# Patient Record
Sex: MALE | Race: WHITE
[De-identification: names, ages, dates, MRNs, and addresses within clinical notes are randomized per-mention and may not be internally consistent; named-entity substitution may affect disease eponyms.]

---

## 2018-01-16 ENCOUNTER — HOSPITAL ENCOUNTER (INPATIENT)
Dept: HOSPITAL 68 - ERH | Age: 58
LOS: 5 days | Discharge: HOME HEALTH | DRG: 392 | End: 2018-01-21
Attending: SURGERY | Admitting: SURGERY
Payer: COMMERCIAL

## 2018-01-16 VITALS — WEIGHT: 180 LBS | HEIGHT: 71 IN | BODY MASS INDEX: 25.2 KG/M2

## 2018-01-16 VITALS — DIASTOLIC BLOOD PRESSURE: 86 MMHG | SYSTOLIC BLOOD PRESSURE: 130 MMHG

## 2018-01-16 VITALS — DIASTOLIC BLOOD PRESSURE: 80 MMHG | SYSTOLIC BLOOD PRESSURE: 120 MMHG

## 2018-01-16 DIAGNOSIS — B96.20: ICD-10-CM

## 2018-01-16 DIAGNOSIS — K57.20: Primary | ICD-10-CM

## 2018-01-16 DIAGNOSIS — B95.4: ICD-10-CM

## 2018-01-16 DIAGNOSIS — F17.210: ICD-10-CM

## 2018-01-16 LAB
ABSOLUTE GRANULOCYTE CT: 17 /CUMM (ref 1.4–6.5)
BASOPHILS # BLD: 0.1 /CUMM (ref 0–0.2)
BASOPHILS NFR BLD: 0.3 % (ref 0–2)
EOSINOPHIL # BLD: 0.1 /CUMM (ref 0–0.7)
EOSINOPHIL NFR BLD: 0.5 % (ref 0–5)
ERYTHROCYTE [DISTWIDTH] IN BLOOD BY AUTOMATED COUNT: 13.6 % (ref 11.5–14.5)
GRANULOCYTES NFR BLD: 86.2 % (ref 42.2–75.2)
HCT VFR BLD CALC: 46.1 % (ref 42–52)
LYMPHOCYTES # BLD: 1.1 /CUMM (ref 1.2–3.4)
MCH RBC QN AUTO: 32.5 PG (ref 27–31)
MCHC RBC AUTO-ENTMCNC: 33.5 G/DL (ref 33–37)
MCV RBC AUTO: 97 FL (ref 80–94)
MONOCYTES # BLD: 1.5 /CUMM (ref 0.1–0.6)
PLATELET # BLD: 571 /CUMM (ref 130–400)
PMV BLD AUTO: 7.1 FL (ref 7.4–10.4)
PROTHROMBIN TIME: 13.2 SEC (ref 9.4–12.5)
RED BLOOD CELL CT: 4.75 /CUMM (ref 4.7–6.1)
WBC # BLD AUTO: 19.7 /CUMM (ref 4.8–10.8)

## 2018-01-16 PROCEDURE — 2NBSP: CPT

## 2018-01-16 PROCEDURE — 0J9830Z DRAINAGE OF ABDOMEN SUBCUTANEOUS TISSUE AND FASCIA WITH DRAINAGE DEVICE, PERCUTANEOUS APPROACH: ICD-10-PCS

## 2018-01-16 PROCEDURE — 87075 CULTR BACTERIA EXCEPT BLOOD: CPT

## 2018-01-16 PROCEDURE — G0480 DRUG TEST DEF 1-7 CLASSES: HCPCS

## 2018-01-16 PROCEDURE — 2NSBP: CPT

## 2018-01-16 PROCEDURE — S5012 5% DEXTROSE WITH POTASSIUM: HCPCS

## 2018-01-16 NOTE — CT SCAN REPORT
EXAMINATION:
CT ABDOMEN AND PELVIS WITH CONTRAST
 
CLINICAL INFORMATION:
Generalized abdominal pain and leukocytosis.
 
COMPARISON:
None
 
TECHNIQUE:
Multidetector volumetric imaging was performed of the abdomen and pelvis
following IV administration of 95 mL of Optiray 320 intravenous contrast.
Sagittal and coronal reformatted images were obtained on the technologist's
workstation.
 
DLP:
412 mGy-cm
 
FINDINGS:
 
LUNG BASES: Trace right pleural effusion and mild right basilar atelectasis.
 
LIVER, GALLBLADDER, AND BILIARY TREE: Liver has normal size, contour and
attenuation. No evidence of hepatic mass. Gallbladder is moderately distended
and without radiopaque calculi or wall edema. No intrahepatic or extrahepatic
bile duct dilatation.
 
PANCREAS: Unremarkable.
 
SPLEEN: Unremarkable.
 
ADRENAL GLANDS: Unremarkable.
 
KIDNEYS AND URETERS: Kidneys have normal size, cortical thickness and
attenuation. No nephrolithiasis or hydronephrosis. The two subcentimeter
sized foci of hypoattenuation left kidney are likely cysts but are too small
to definitively characterize. The ureters are unremarkable.
 
BLADDER: Unremarkable.
 
GASTROINTESTINAL TRACT: Stomach is normal. The terminal ileum is normal. The
appendix is normal. There are multiple diverticula of the descending and
sigmoid colon, and there is fat stranding around the inflamed sigmoid colon.
A 6.5 x 7.5 x 7 cm collection within the sigmoid mesentery containing gas and
fluid is compatible with a perisigmoid abscess, and this exerts mass effect
upon adjacent small bowel which has an edematous wall. Proximal to this
level, small bowel is dilated up to 3.6 cm AP diameter. A small amount of
rim-enhancing fluid extends posteriorly and distally into the pelvic
cul-de-sac.  No pneumoperitoneum.
 
ABDOMINAL WALL: Unremarkable.
 
LYMPH NODES: Mild lymphadenopathy within the mesentery; largest mesenteric
lymph nodes measure up to 0.9 cm short axis dimension. No pathologic sized
retroperitoneal, iliac or inguinal lymph nodes.
 
VASCULAR: Mild atherosclerosis of the abdominal aorta without aneurysm.
 
PELVIC VISCERA: Prostate gland measures approximately 4.8 x 3.1 x 4.1 cm.
 
OSSEOUS STRUCTURES: No suspicious bone lesions. Mild degenerative disc space
narrowing and traction osteophyte formation of the visualized lower thoracic
spine. There are Schmorl's nodes at the L2 and L3 superior endplates.
 
IMPRESSION:
Sigmoid colon diverticulitis with 6.5 x 7.5 x 7 cm perisigmoid abscess. The
abscess exerts mass effect upon the adjacent small bowel, and there is
reactive inflammatory wall thickening of the small bowel. Small bowel is
dilated up to 3.6 cm AP diameter -- either from obstruction caused by the
mass effect from the abscess or from an ileus. No pneumoperitoneum. There is
mild lymphadenopathy within the mesentery.

## 2018-01-16 NOTE — ADMISSION CORE MEASURES
Acute Coronary Syndrome (CM)
 
ACS Core Measures
Acute Coronary Syndrome Diagnosis No
 
Congestive Heart Failure (NEW)
 
CHF Core Measures
Congestive Heart Failure Diagnosis No
 
Cerebrovascular Accident (NEW)
 
CVA Core Measures
CVA/TIA Diagnosis No
 
Venous Thromboembolism AUG17
 
VTE Core Anju (View Protocol)
VTE Risk Factors Age>40
No Mechanical VTE Prophylaxis d/t N/A MechProphylax Ordered
No VTE Pharm Prophylaxis d/t NA PharmProphylax ordered
 
Problem List
 
As ranked by this Provider
includes Assessment & Plan
 1. Abscess of sigmoid colon due to diverticulitis
 
 
HOME MEDS
Home Med List
No Known Home Medications

## 2018-01-16 NOTE — CT SCAN REPORT
PROCEDURE:
CAT scan guided abscess drainage
 
INDICATION:
Perisigmoid abscess
 
ACCESS:
12 Bulgarian locking pigtail catheter
 
SPECIMEN:
Culture Specimen were appropriately labeled and sent to the laboratory for
evaluation with request to inform  the referring physician of results.
 
MEDICATIONS/ SEDATION:
-Moderate sedation was provided under the direct supervision of a
interventional radiology nurse. A total dosage of 2 mg Versed and 100 mcg
Fentanyl was administered in divided doses. Total sedation time of 20
minutes.
-10ml 1% lidocaine for local anesthesia
 
COMPARISON:
Same day CT abdomen pelvis
 
DLP:
363 mGy-cm
 
CONSENT:
Informed consent was obtained from the patient prior to the procedure. During
this process, the procedure and potential alternatives was explained along
with the intended outcome and benefits. The risks of the procedure, including
the possibility of an unsuccessful procedure, as well as the risk of not
doing the procedure were discussed. The patient was given the opportunity to
ask any questions regarding the procedure and appeared competent to make
medical decisions. A signed consent form which documents this discussion was
placed in the medical record.
 
TECHNIQUE:
The patient was brought to the CAT scan room and placed in the supine
position. A final timeout was completed. CAT scan images of abdomen/pelvis
were obtained to localize the perisigmoid abscess.
 
An area of the patient's lower abdomen/upper pelvis was prepped and draped in
standard sterile fashion. 10ml of 1% lidocaine was used to obtain local
anesthesia of the skin and deeper tissues. An 5 Bulgarian Yueh needle was passed
through the skin and placed into the abscess using CT fluoroscopic guidance.
A guidewire was passed through the  needle and coiled within the abscess.
This was confirmed with CT fluoroscopic imaging. The access needle was
removed.
 
A series of tissue dilators numbering 8, 10 and 12 were used to dilate the
tract. A 12 Bulgarian locking pigtail catheter was passed over the wire and
placed within the abscess. One suture was used to secure the catheter to the
skin. A StatLock device was also utilized. A sterile dressing was applied.
The catheter was attached to a gravity collection bag.
 
SAMPLE:
Approximately 30 mL of bloody, nonclotting pelvis was aspirated in the room.
A sample was sent for culture.
 
COMPLICATIONS:
The patient tolerated the procedure well without complications. He was
transferred back to the emergency room in good condition.
 
CONCLUSION:
CAT scan guided abscess drainage as described.
 
PLAN:
1. Continued drainage of abscess into gravity collection bag.
2. Flushing of drainage catheter with 10 mL sterile saline twice daily.
3. Drain will be managed by the surgical team.

## 2018-01-16 NOTE — HISTORY & PHYSICAL
**See Addendum**
Jerel Blood 18 1529:
General Information and HPI
History of Present Illness:
This is a previously healthy 57 year-old male who presents to the emergency room
complaining of a week long history of progressively worsening abdominal pain 
with associated subjective fevers, chills and decreased appetite. Patient 
describes the pain as sharp and constant in a band like distribution in his 
lower abdomen. He reports feeling dehydrated due to a lack of appetite from his 
pain. He states he has not had anything solid to eat in a week. He last drank 
coffee this morning. He reports change in bowel function, he states 5 days ago 
he was constipated and then has had diarrhea over the past two days. He denies 
nausea or vomiting, chest pain, difficulty breathing, shortness or breath or 
urinary changes. He denies having a colonoscopy/endoscopy. He reports drinking 4
-6 per day, and states his last beer was last monday. He reports his uncle has a
history of diverticultitis. 
 
Allergies/Medications
Allergies:
Coded Allergies:
NO KNOWN ALLERGIES (14)
 
Home Med list
No Known Home Medications
 
 
Past History
 
Travel History
Traveled to [place] past 21 day No
 
Medical History
Gastrointestinal: inguinal hernia
 
Surgical History
Surgical History: hernia repair-inguinal (Lap left in 2014)
 
Past Family/Social History
 
Family History
Relations & Conditions if any
FATHER, ; Cause: Multiple myeloma.
 
 
Psychosocial History
Smoking Status: Current Everyday Smoker (15 cigs/day)
ETOH Use: 4-6 beers/day
Illicit Drug Use: marijuana (FREQENTLY)
 
Employment History
Profession/Employer 
 
Review of Systems
 
Review of Systems
Constitutional:
Denies: see HPI. 
EENTM:
Denies: no symptoms. 
Cardiovascular:
Denies: no symptoms. 
Respiratory:
Denies: no symptoms. 
GI:
Denies: see HPI. 
Genitourinary:
Denies: no symptoms. 
Musculoskeletal:
Denies: no symptoms. 
Skin:
Denies: no symptoms. 
Neurological/Psychological:
Denies: no symptoms. 
All Other Systems: Reviewed and Negative
Colonoscopy Testing Status: Test never done
 
Exam & Diagnostic Data
Last 24 Hrs of Vital Signs/I&O
 Vital Signs
 
 
Date Time Temp Pulse Resp B/P B/P Pulse O2 O2 Flow FiO2
 
     Mean Ox Delivery Rate 
 
 1430 99.8        
 
 1430 99.8 88 20 131/80  96 Room Air  
 
01/16 1355  96 20 133/87  94   
 
01/16 1346 101.0        
 
 1320 101.1 98 20 145/100  98 Room Air  
 
 1126 99.6 113 18 130/93  96 Room Air  
 
 
 Intake & Output
 
 
  1600  0800  0000
 
Intake Total 1000  
 
Output Total   
 
Balance 1000  
 
    
 
Intake, IV 1000  
 
Patient 180 lb  
 
Weight   
 
Weight Reported by Patient  
 
Measurement   
 
Method   
 
 
 
 
Physical Exam
General Appearance Alert, Oriented X3, No Acute Distress
Skin Temp/Moisture Exam: Warm/Dry
HEENT Dry mucus membrane
Neck Supple, No JVD, No LAD
Cardiovascular Regular Rate, Normal S1, Normal S2
Lungs Clear to Auscultation, Normal Air Movement
Abdomen Softly distended with well-healed laparoscopic incisions, hypoactive 
bowel sounds, tympanic to percussion, moderately tender to deep palpation in 
right-mid lower quadrant, midly tender in left lower quadrant with voluntary 
guarding, no rebound tenderness
Extremities No Edema, No Tenderness/Swelling
Last 24 Hrs of Labs/Harjinder:
 Laboratory Tests
 
18 1456:
PT Cancelled, INR Cancelled
 
18 1427:
Lactic Acid Cancelled
 
18 1139:
Anion Gap 18  H, Estimated GFR > 60, BUN/Creatinine Ratio 18.9, Glucose 123  H, 
Lactic Acid 1.8, Calcium 9.0, Total Bilirubin 2.7  H, AST 30, ALT 53, Alkaline 
Phosphatase 166  H, Troponin I < 0.01, C-Reactive Prot, Quant > 9.0  H, C-React 
Prot High Sens > 15.0  H, Total Protein 6.5, Albumin 3.6, Globulin 2.9, Albumin/
Globulin Ratio 1.2, Amylase 34, Lipase 34, PT 13.2  H, INR 1.26  H, CBC w Diff 
MAN DIFF ORDERED, RBC 4.75, MCV 97.0  H, MCH 32.5  H, RDW 13.6, MPV 7.1  L, Gran
% 86.2  H, Lymphocytes % 5.4  L, Monocytes % 7.6, Eosinophils % 0.5, Basophils %
0.3, Absolute Granulocytes 17.0  H, Segmented Neutrophils 63, Band Neutrophils 
18  H, Absolute Lymphocytes 1.1  L, Lymphocytes 11  L, Monocytes 7, Absolute 
Monocytes 1.5  H, Eosinophils 1, Absolute Eosinophils 0.1, Absolute Basophils 
0.1, Platelet Estimate INCREASED, Normocytic RBCs VERIFIED, Normochromic RBCs 
VERIFIED, PUBS MCHC 33.5, Serum Alcohol < 10.0
 Microbiology
 1532  BODY FLUID: Body Fluid Culture - ORD
 1532  BODY FLUID: Gram Stain - ORD
 1442  BODY FLUID: Body Fluid Culture - ORD
 1442  BODY FLUID: Gram Stain - ORD
 1430  BLOOD: Blood Culture - RECD
 1430  BLOOD: Blood Culture - RECD
 
 
 
Diagnostic Data
Other Results
SERVICE DATE: 
EXAM TYPE: CAT - CT ABD & PELVIS W IV CONTRAST
 
EXAMINATION:
CT ABDOMEN AND PELVIS WITH CONTRAST
 
CLINICAL INFORMATION:
Generalized abdominal pain and leukocytosis.
 
COMPARISON:
None
 
TECHNIQUE:
Multidetector volumetric imaging was performed of the abdomen and pelvis
following IV administration of 95 mL of Optiray 320 intravenous contrast.
Sagittal and coronal reformatted images were obtained on the technologist's
workstation.
 
DLP:
412 mGy-cm
 
FINDINGS:
 
LUNG BASES: Trace right pleural effusion and mild right basilar atelectasis.
 
LIVER, GALLBLADDER, AND BILIARY TREE: Liver has normal size, contour and
attenuation. No evidence of hepatic mass. Gallbladder is moderately distended
and without radiopaque calculi or wall edema. No intrahepatic or extrahepatic
bile duct dilatation.
 
PANCREAS: Unremarkable.
 
SPLEEN: Unremarkable.
 
ADRENAL GLANDS: Unremarkable.
 
KIDNEYS AND URETERS: Kidneys have normal size, cortical thickness and
attenuation. No nephrolithiasis or hydronephrosis. The two subcentimeter
sized foci of hypoattenuation left kidney are likely cysts but are too small
to definitively characterize. The ureters are unremarkable.
 
BLADDER: Unremarkable.
 
GASTROINTESTINAL TRACT: Stomach is normal. The terminal ileum is normal. The
appendix is normal. There are multiple diverticula of the descending and
sigmoid colon, and there is fat stranding around the inflamed sigmoid colon.
A 6.5 x 7.5 x 7 cm collection within the sigmoid mesentery containing gas and
fluid is compatible with a perisigmoid abscess, and this exerts mass effect
upon adjacent small bowel which has an edematous wall. Proximal to this
level, small bowel is dilated up to 3.6 cm AP diameter. A small amount of
rim-enhancing fluid extends posteriorly and distally into the pelvic
cul-de-sac.  No pneumoperitoneum.
 
ABDOMINAL WALL: Unremarkable.
 
LYMPH NODES: Mild lymphadenopathy within the mesentery; largest mesenteric
lymph nodes measure up to 0.9 cm short axis dimension. No pathologic sized
retroperitoneal, iliac or inguinal lymph nodes.
 
VASCULAR: Mild atherosclerosis of the abdominal aorta without aneurysm.
 
PELVIC VISCERA: Prostate gland measures approximately 4.8 x 3.1 x 4.1 cm.
 
OSSEOUS STRUCTURES: No suspicious bone lesions. Mild degenerative disc space
narrowing and traction osteophyte formation of the visualized lower thoracic
spine. There are Schmorl's nodes at the L2 and L3 superior endplates.
 
IMPRESSION:
Sigmoid colon diverticulitis with 6.5 x 7.5 x 7 cm perisigmoid abscess. The
abscess exerts mass effect upon the adjacent small bowel, and there is
reactive inflammatory wall thickening of the small bowel. Small bowel is
dilated up to 3.6 cm AP diameter -- either from obstruction caused by the
mass effect from the abscess or from an ileus. No pneumoperitoneum. There is
mild lymphadenopathy within the mesentery.
 
Assessment/Plan
Assessment:
This is a previously healthy 57 year-old male who presents with his first 
episode of sigmoid diverticulitis with a 6.5 x 7.5 x 7 cm perisigmoid abscess
 
Admit to gen/surg floor
Keep NPO on IVF with supplemental K
Proceed to IR for CT-guided abscess drainage
Start Unasyn 3 grams Q8 until cultures return
IV Morphine and Tylenol for pain control
GI and DVT on board
D/w Dr. Sanchez
 
As Ranked By This Provider
Problem List:
 1. Sigmoid diverticulitis
 
 2. Abscess of sigmoid colon due to diverticulitis
 
 
Core Measures/Misc ()
 
Acute Coronary Syndrome
ACS Diagnosis: No
 
Congestive Heart Failure
Congestive Heart Failure Diagnosis No
 
Cerebrovascular Accident
CVA/TIA Diagnosis: No
 
VTE (View Protocol)
VTE Risk Factors Age>40
No Mechanical VTE Prophylaxis d/t N/A MechProphylax Ordered
No VTE Pharm Prophylaxis d/t NA PharmProphylax ordered
 
Sepsis (View protocol)
Sepsis Present: Yes
 
 
Dontrell Sanchez MD 18 0845:
Attending MD Review Statement
 
Attending Statement
Attending MD Statement: examined this patient, discuss w/resident/PA/NP, 
reviewed images
Attending Assessment/Plan:
as above. patient presents with first episode diverticulitis with abscess of 
sigmoid colon, contained by small bowel loop. Case discussed with IR. The 
abscess is amenable to percutaneous drainage, which has been conducted. Marked 
purulence drained and patient feels better. cultures pending. continue iv unasyn
pending culture results.

## 2018-01-16 NOTE — ED GI/GU/ABDOMINAL COMPLAINT
History of Present Illness
 
General
Chief Complaint: Abdominal Pain/Flank Pain
Stated Complaint: ABD PAIN
Source: patient, family
Exam Limitations: no limitations
 
Vital Signs & Intake/Output
Vital Signs & Intake/Output
 Vital Signs
 
 
Date Time Temp Pulse Resp B/P B/P Pulse O2 O2 Flow FiO2
 
     Mean Ox Delivery Rate 
 
 1430 99.8        
 
 1430 99.8 88 20 131/80  96 Room Air  
 
 1355  96 20 133/87  94   
 
 1346 101.0        
 
 1320 101.1 98 20 145/100  98 Room Air  
 
 1126 99.6 113 18 130/93  96 Room Air  
 
 
 
Allergies
Coded Allergies:
NO KNOWN ALLERGIES (14)
 
Reconcile Medications
No Known Home Medications
 
Triage Note:
PATIENT TO ER FROM [facility] C/C
LOWER ABD PAIN SHARP WITH ANOREXIA X 1 WEEK,
CONSTANT. MILD NAUSEA. DENIES URINARY S/S
Triage Nurses Notes Reviewed? yes
Onset: Gradual
Duration: week(s):
Timing: recent history
Quality/Severity: moderate, sharpness
Severity Numbers: 8
Location: left lower quadrant, right lower quadrant
HPI:
58yo male presents to ED complaining of bilateral lower abdominal pain x 1 week.
Pain described as 8/10, stabbing pain, fluctuating in intensity. Pain has been 
constant, patient tried Advil which helped slightly. PAtient had constipation x 
5 days during which time he passed no flatus then diarrhea for the past few 
days. Patient also reports cough productive of white phlem. Patient has been 
drinking fluids daily. Current daily cigarette and marijuana smoker and drinks 4
-6 beers per day however no alcohol with current abdominal pain. Patient reports
tacticle fevers and chills about 1 week ago. 1 episode of nonbilious, nonbloody 
vomiting 1 week ago. Hx of prior inguinal hernia. No hx of similar abdominal 
pain.
 
Past History
 
Travel History
Traveled to [place] past 21 day No
 
Medical History
Any Pertinent Medical History? see below for history
Gastrointestinal: inguinal hernia
 
Surgical History
Surgical History: hernia repair-inguinal
 
Psychosocial History
What is your primary language English
Tobacco Use: Current Daily Use
Daily Tobacco Use Amount/Type: => 5 Cigarettes daily
 
Family History
Hx Contributory? No
 
Review of Systems
 
Review of Systems
Constitutional:
Reports: see HPI. 
EENTM:
Reports: no symptoms. 
Respiratory:
Reports: no symptoms. 
Cardiovascular:
Reports: no symptoms. 
GI:
Reports: see HPI. 
Genitourinary:
Reports: no symptoms. 
Musculoskeletal:
Reports: no symptoms. 
Skin:
Reports: no symptoms. 
Neurological/Psychological:
Reports: no symptoms. 
Hematologic/Endocrine:
Reports: no symptoms. 
Immunologic/Allergic:
Reports: no symptoms. 
All Other Systems: Reviewed and Negative
 
Physical Exam
 
Physical Exam
General Appearance: well developed/nourished, no apparent distress, alert, awake
Head: atraumatic, normal appearance
Eyes:
Bilateral: normal appearance. 
Ears, Nose, Throat, Mouth: hearing grossly normal
Neck: normal inspection, supple, full range of motion
Respiratory: normal breath sounds, no respiratory distress, lungs clear
Cardiovascular: regular rate/rhythm
Gastrointestinal: mild distention, tenderness through out all quadrants with 
gaurding
Back: normal inspection, normal range of motion
Extremities: normal range of motion
Neurologic/Psych: awake, alert, oriented x 3
Skin: intact, normal color, warm/dry
 
Core Measures
ACS in differential dx? No
Sepsis Present: No
Sepsis Focused Exam Completed? No
 
Progress
Differential Diagnosis: appendicitis, bowel obstruction, colon cancer, 
cholecystitis, diverticulitis, gastritis, hepatitis, hernia, hemorrhoids, 
inflamm bowel dis, pancreatitis, peptic ulcer, PUD/GERD, perforated viscous, SBO
, ureterolithiasis
Plan of Care:
 Orders
 
 
Procedure Date/time Status
 
CT PERCUTANEOUS ABSCESS DRAIN  1513 Active
 
Add-on Test (ER Only)  1456 Active
 
CULTURE,BODY FLUID  1442 Active
 
BLOOD CULTURE  1358 Active
 
Add-on Test (ER Only)  1221 Active
 
TROPONIN LEVEL  1139 Complete
 
PROTHROMBIN TIME  1139 Complete
 
LIPASE  1139 Complete
 
ETHANOL  1139 Complete
 
C-REACTIVE PROTEIN  1139 Complete
 
AMYLASE  1139 Complete
 
EKG  1129 Active
 
LACTIC ACID  1127 Complete
 
HIGH SENSITIVITY CRP  1127 Complete
 
COMPREHENSIVE METABOLIC PANEL  1127 Complete
 
CBC WITHOUT DIFFERENTIAL  1127 Complete
 
 
 Current Medications
 
 
  Sig/Eddie Start time  Last
 
Medication Dose  Stop Time Status Admin
 
Ampicillin Sodium/ 3,000 MG ONCE ONE  1500 AC 
 
Sulbactam Sodium    1529  
 
(Unasyn)     
 
Sodium Chloride 100 ML    
 
(Normal Saline 0.9%)     
 
 
 Laboratory Tests
 
 
 
18 1456:
PT Cancelled, INR Cancelled
 
18 1427:
Lactic Acid Cancelled
 
18 1139:
Anion Gap 18  H, Estimated GFR > 60, BUN/Creatinine Ratio 18.9, Glucose 123  H, 
Lactic Acid 1.8, Calcium 9.0, Total Bilirubin 2.7  H, AST 30, ALT 53, Alkaline 
Phosphatase 166  H, Troponin I < 0.01, C-Reactive Prot, Quant > 9.0  H, C-React 
Prot High Sens > 15.0  H, Total Protein 6.5, Albumin 3.6, Globulin 2.9, Albumin/
Globulin Ratio 1.2, Amylase 34, Lipase 34, PT 13.2  H, INR 1.26  H, CBC w Diff 
MAN DIFF ORDERED, RBC 4.75, MCV 97.0  H, MCH 32.5  H, RDW 13.6, MPV 7.1  L, Gran
% 86.2  H, Lymphocytes % 5.4  L, Monocytes % 7.6, Eosinophils % 0.5, Basophils %
0.3, Absolute Granulocytes 17.0  H, Segmented Neutrophils 63, Band Neutrophils 
18  H, Absolute Lymphocytes 1.1  L, Lymphocytes 11  L, Monocytes 7, Absolute 
Monocytes 1.5  H, Eosinophils 1, Absolute Eosinophils 0.1, Absolute Basophils 
0.1, Platelet Estimate INCREASED, Normocytic RBCs VERIFIED, Normochromic RBCs 
VERIFIED, PUBS MCHC 33.5, Serum Alcohol < 10.0
 Microbiology
 1442  BODY FLUID: Body Fluid Culture - ORD
 1442  BODY FLUID: Gram Stain - ORD
 1430  BLOOD: Blood Culture - RECD
 1430  BLOOD: Blood Culture - RECD
 
CT scan shows diverticulitis with perisigmoid abscess.  Discussed these findings
with Dr. Sanchez.  Surgical PA present to see and evaluate patient.  Patient 
started on IV Unasyn.  Patient will have CT-guided IR procedure for abscess and 
be admitted under surgical service.  Patient medicated with IV acetaminophen and
IV fluids regarding his fever.
Diagnostic Imaging:
Viewed by Me: CT Scan.  Discussed w/RAD: CT Scan. 
Radiology Impression: PATIENT: LANNY VILLA  MEDICAL RECORD NO: [id number] PRESENT 
AGE: 57  PATIENT ACCOUNT NO: [id number] : [birth year]  LOCATION: [facility] ORDERING 
PHYSICIAN: Melinda RALPH     SERVICE DATE: 4 EXAM TYPE: CAT -
CT ABD & PELVIS W IV CONTRAST EXAMINATION: CT ABDOMEN AND PELVIS WITH CONTRAST 
CLINICAL INFORMATION: Generalized abdominal pain and leukocytosis. COMPARISON: 
None TECHNIQUE: Multidetector volumetric imaging was performed of the abdomen 
and pelvis following IV administration of 95 mL of Optiray 320 intravenous 
contrast. Sagittal and coronal reformatted images were obtained on the 
technologist's workstation. DLP: 412 mGy-cm FINDINGS: LUNG BASES: Trace right 
pleural effusion and mild right basilar atelectasis. LIVER, GALLBLADDER, AND 
BILIARY TREE: Liver has normal size, contour and attenuation. No evidence of 
hepatic mass. Gallbladder is moderately distended and without radiopaque calculi
or wall edema. No intrahepatic or extrahepatic bile duct dilatation. PANCREAS: 
Unremarkable. SPLEEN: Unremarkable. ADRENAL GLANDS: Unremarkable. KIDNEYS AND 
URETERS: Kidneys have normal size, cortical thickness and attenuation. No 
nephrolithiasis or hydronephrosis. The two subcentimeter sized foci of 
hypoattenuation left kidney are likely cysts but are too small to definitively 
characterize. The ureters are unremarkable. BLADDER: Unremarkable. 
GASTROINTESTINAL TRACT: Stomach is normal. The terminal ileum is normal. The 
appendix is normal. There are multiple diverticula of the descending and sigmoid
colon, and there is fat stranding around the inflamed sigmoid colon. A 6.5 x 7.5
x 7 cm collection within the sigmoid mesentery containing gas and fluid is 
compatible with a perisigmoid abscess, and this exerts mass effect upon adjacent
small bowel which has an edematous wall. Proximal to this level, small bowel is 
dilated up to 3.6 cm AP diameter. A small amount of rim-enhancing fluid extends 
posteriorly and distally into the pelvic cul-de-sac.  No pneumoperitoneum. 
ABDOMINAL WALL: Unremarkable. LYMPH NODES: Mild lymphadenopathy within the 
mesentery; largest mesenteric lymph nodes measure up to 0.9 cm short axis 
dimension. No pathologic sized retroperitoneal, iliac or inguinal lymph nodes. 
VASCULAR: Mild atherosclerosis of the abdominal aorta without aneurysm. PELVIC 
VISCERA: Prostate gland measures approximately 4.8 x 3.1 x 4.1 cm. OSSEOUS 
STRUCTURES: No suspicious bone lesions. Mild degenerative disc space narrowing 
and traction osteophyte formation of the visualized lower thoracic spine. There 
are Schmorl's nodes at the L2 and L3 superior endplates. IMPRESSION: Sigmoid 
colon diverticulitis with 6.5 x 7.5 x 7 cm perisigmoid abscess. The abscess 
exerts mass effect upon the adjacent small bowel, and there is reactive 
inflammatory wall thickening of the small bowel. Small bowel is dilated up to 
3.6 cm AP diameter -- either from obstruction caused by the mass effect from the
abscess or from an ileus. No pneumoperitoneum. There is mild lymphadenopathy 
within the mesentery. DICTATED BY: Eliezer Garcia MD  DATE/TIME DICTATED:1323 :RAD.VENCES  DATE/TIME TRANSCRIBED:18 
CONFIDENTIAL, DO NOT COPY WITHOUT APPROPRIATE AUTHORIZATION.  <Electronically 
signed in Other Vendor System>                                                  
                                     SIGNED BY: Eliezer Garcia MD 18 1340
Initial ED EKG: sinus tachycardia @106bpm, nonspecific ST changes
Prior EKG: changed (14)
 
Departure
 
Departure
Disposition: STILL A PATIENT
Condition: Stable
Clinical Impression
Primary Impression: Diverticulitis
Qualifiers:  Diverticulitis site: large intestine Diverticulitis bleeding: 
without bleeding Diverticulitis complication: with abscess Qualified Code: 
K57.20 - Diverticulitis of large intestine with perforation and abscess without 
bleeding
Referrals:
Marilyn HOLMAN,Tc MIRZA (PCP/Family)
 
Departure Forms:
Customer Survey
General Discharge Information
Prescriptions:
Current Visit Scripts
No Known Home Medications     
 
 
Admission Note
Spoke With:
Daniel HOLMAN,Dontrell COY
Documentation of Exam:
Documentation of any treatments & extenuating circumstances including Concerns 
Regarding Discharge (functional status, medication knowledge or non-compliance, 
living conditions, etc.) that warrant an admission rather than observation: [
Diverticulitis with perisigmoid abscess requiring IV antibiotics, IR CT guided 
procedure, surgical consultation, premature discharge would be medically unsafe.
]

## 2018-01-17 VITALS — SYSTOLIC BLOOD PRESSURE: 130 MMHG | DIASTOLIC BLOOD PRESSURE: 80 MMHG

## 2018-01-17 VITALS — DIASTOLIC BLOOD PRESSURE: 80 MMHG | SYSTOLIC BLOOD PRESSURE: 130 MMHG

## 2018-01-17 VITALS — DIASTOLIC BLOOD PRESSURE: 78 MMHG | SYSTOLIC BLOOD PRESSURE: 128 MMHG

## 2018-01-17 VITALS — SYSTOLIC BLOOD PRESSURE: 124 MMHG | DIASTOLIC BLOOD PRESSURE: 76 MMHG

## 2018-01-17 VITALS — SYSTOLIC BLOOD PRESSURE: 130 MMHG | DIASTOLIC BLOOD PRESSURE: 78 MMHG

## 2018-01-17 VITALS — DIASTOLIC BLOOD PRESSURE: 78 MMHG | SYSTOLIC BLOOD PRESSURE: 122 MMHG

## 2018-01-17 LAB
ABSOLUTE GRANULOCYTE CT: 9.5 /CUMM (ref 1.4–6.5)
BASOPHILS # BLD: 0 /CUMM (ref 0–0.2)
BASOPHILS NFR BLD: 0.1 % (ref 0–2)
EOSINOPHIL # BLD: 0.3 /CUMM (ref 0–0.7)
EOSINOPHIL NFR BLD: 2 % (ref 0–5)
ERYTHROCYTE [DISTWIDTH] IN BLOOD BY AUTOMATED COUNT: 14 % (ref 11.5–14.5)
GRANULOCYTES NFR BLD: 75.1 % (ref 42.2–75.2)
HCT VFR BLD CALC: 37 % (ref 42–52)
LYMPHOCYTES # BLD: 1.6 /CUMM (ref 1.2–3.4)
MCH RBC QN AUTO: 33.1 PG (ref 27–31)
MCHC RBC AUTO-ENTMCNC: 34 G/DL (ref 33–37)
MCV RBC AUTO: 97.3 FL (ref 80–94)
MONOCYTES # BLD: 1.3 /CUMM (ref 0.1–0.6)
PLATELET # BLD: 494 /CUMM (ref 130–400)
PMV BLD AUTO: 7.3 FL (ref 7.4–10.4)
RED BLOOD CELL CT: 3.81 /CUMM (ref 4.7–6.1)
WBC # BLD AUTO: 12.6 /CUMM (ref 4.8–10.8)

## 2018-01-17 NOTE — PN- GENERAL SURGERY
**See Addendum**
Subjective
Subjective:
Reports pain improves with morphine. Percutaneous drainage done by IR yesterday,
with remaining drain and collection bag. Currently npo. Out of bed to bathroom 
without difficulty. Denies chills/sweats. No dysuria. Passing some flatus. No 
bm. Last fever yesterday afternoon around 1 pm.
 
Objective
Vital Signs and I&Os
Vital Signs
 
 
Date Time Temp Pulse Resp B/P B/P Pulse O2 O2 Flow FiO2
 
     Mean Ox Delivery Rate 
 
01/17 0605 98.2 66 20 122/78  93 Room Air  
 
01/17 0216 98.5 68 20 130/78  93 Room Air  
 
01/16 2152 98.3 89 19 130/86  95 Room Air  
 
01/16 1946       Room Air  
 
01/16 1929 98.7 85 19 120/80  97 Room Air  
 
01/16 1905 99.5 80 18 130/78  95 Room Air Room Air 
 
01/16 1620 99.4 87 19 142/81  95 Room Air  
 
01/16 1430 99.8        
 
01/16 1430 99.8 88 20 131/80  96 Room Air  
 
01/16 1355  96 20 133/87  94   
 
01/16 1346 101.0        
 
01/16 1320 101.1 98 20 145/100  98 Room Air  
 
01/16 1126 99.6 113 18 130/93  96 Room Air  
 
 
 Intake & Output
 
 
 01/17 0800 01/17 0000 01/16 1600 01/16 0800 01/16 0000 01/15 1600
 
Intake Total 7465 033 2313   
 
Output Total 620 430    
 
Balance 440 -120 1000   
 
       
 
Intake, IV 6536 803 3971   
 
Intake, Oral 60 60    
 
Output, 20 430    
 
Drainage      
 
Output, Other      
 
Output, Urine 600     
 
Patient  180 lb 180 lb   
 
Weight      
 
Weight  Reported by Patient Reported by Patient   
 
Measurement      
 
Method      
 
 
 
Physical Exam:
General - alert & oriented x 3. comfortable. no acute distress.
Lungs - clear bilaterally. no w/r/r.
Cardiac - s1s2. reg.
Abdomen - soft. tenderness mostly left lower quadrant, but also around perc 
drain site centrally.
Extremities - warm bilaterally. no c/c/e. calves soft and nontender b/l.
Current Medications:
 Current Medications
 
 
  Sig/Eddie Start time  Last
 
Medication Dose Route Stop Time Status Admin
 
Acetaminophen 1,000 MG Q6P PRN 01/16 1530 AC 01/16
 
N/A 1 UNIT IV   2130
 
Acetaminophen 0 .STK-MED ONE 01/16 1351 DC 
 
  IV   
 
Acetaminophen 1,000 MG ONCE ONE 01/16 1330 DC 01/16
 
N/A 1 UNIT IV 01/16 1344  1346
 
Ampicillin Sodium/ 3,000 MG Q6H 01/16 2200 AC 01/17
 
Sulbactam Sodium  IV   0416
 
Sodium Chloride 100 ML    
 
Ampicillin Sodium/ 3,000 MG Q6 01/16 1800 DC 
 
Sulbactam Sodium  IV   
 
Sodium Chloride 100 ML    
 
Ampicillin Sodium/ 0 .STK-MED ONE 01/16 1542 DC 
 
Sulbactam Sodium  .ROUTE   
 
Ampicillin Sodium/ 3,000 MG ONCE ONE 01/16 1500 DC 01/16
 
Sulbactam Sodium  IV 01/16 1529  1644
 
Sodium Chloride 100 ML    
 
Fentanyl Citrate 0 .STK-MED ONE 01/16 1522 DC 
 
  .ROUTE   
 
Flumazenil 0 .STK-MED ONE 01/16 1521 DC 
 
  IV   
 
Heparin Sodium  5,000 UNIT Q8 01/16 2200 AC 
 
(Porcine)  SC   
 
Influenza Virus  0.5 ML ONCE ONE 01/16 2015 DC 
 
Vaccine  IM 01/16 2016  
 
Lorazepam 0 Q1P PRN 01/16 1745 AC 
 
  IV   
 
Melatonin 5 MG AT BEDTIME 01/17 0015 AC 01/17
 
  PO   0016
 
Midazolam HCl 0 .STK-MED ONE 01/16 1522 DC 
 
  .ROUTE   
 
Morphine Sulfate 4 MG Q4-6 PRN PRN 01/16 1530 AC 01/17
 
  IV   0417
 
Morphine Sulfate 6 MG Q4-6 PRN PRN 01/16 1530 AC 
 
  IV   
 
Morphine Sulfate 2 MG Q4P PRN 01/16 1530 AC 
 
  IV   
 
Morphine Sulfate 4 MG ONCE ONE 01/16 1245 DC 01/16
 
  IV 01/16 1246  1240
 
Morphine Sulfate 0 .STK-MED ONE 01/16 1241 DC 
 
  .ROUTE   
 
Naloxone HCl 0 .STK-MED ONE 01/16 1522 DC 
 
  .ROUTE   
 
Ondansetron HCl 4 MG Q6P PRN 01/16 1530 AC 
 
  IV   
 
Pantoprazole Sodium 40 MG DAILY 01/17 1000 AC 
 
  IV   
 
Potassium Chloride 20 MEQ Q8H 01/16 1715 AC 01/17
 
Dextrose/Sodium  1,000 ML IV   0417
 
Chloride     
 
Potassium Chloride 20 MEQ .Q8H 01/16 1515 CAN 
 
Dextrose/Sodium  1,000 ML IV   
 
Chloride     
 
Sodium Chloride 1,000 ML BOLUS ONE 01/16 1245 DC 01/16
 
  IV 01/16 1344  1240
 
 
 
 
Results
Last 48 Hours of Labs:
 Laboratory Tests
 
 
 01/17 01/16 01/16
 
 0619 1456 1427
 
Chemistry   
 
  Sodium Pending  
 
  Potassium Pending  
 
  Chloride Pending  
 
  Carbon Dioxide Pending  
 
  Anion Gap Pending  
 
  BUN Pending  
 
  Creatinine Pending  
 
  BUN/Creatinine Ratio Pending  
 
  Lactic Acid   Cancelled
 
Coagulation   
 
  PT  Cancelled 
 
  INR  Cancelled 
 
Hematology   
 
  CBC w Diff Pending  
 
  WBC Pending  
 
  RBC Pending  
 
  Hgb Pending  
 
  Hct Pending  
 
  MCV Pending  
 
  MCH Pending  
 
  RDW Pending  
 
  Plt Count Pending  
 
  MPV Pending  
 
  PUBS MCHC Pending  
 
 
 
 
 01/16
 
 1139
 
Chemistry 
 
  Sodium (137 - 145 mmol/L) 139
 
  Potassium (3.5 - 5.1 mmol/L) 3.3  L
 
  Chloride (98 - 107 mmol/L) 94  L
 
  Carbon Dioxide (22 - 30 mmol/L) 27
 
  Anion Gap (5 - 16) 18  H
 
  BUN (9 - 20 mg/dL) 17
 
  Creatinine (0.7 - 1.2 mg/dL) 0.9
 
  Estimated GFR (>60 ml/min) > 60
 
  BUN/Creatinine Ratio (7 - 25 %) 18.9
 
  Glucose (65 - 99 mg/dL) 123  H
 
  Lactic Acid (0.7 - 2.1 mmol/L) 1.8
 
  Calcium (8.4 - 10.2 mg/dL) 9.0
 
  Total Bilirubin (0.2 - 1.3 mg/dL) 2.7  H
 
  AST (17 - 59 U/L) 30
 
  ALT (21 - 72 U/L) 53
 
  Alkaline Phosphatase (< 127 U/L) 166  H
 
  Troponin I (<0.11 ng/ml) < 0.01
 
  C-Reactive Prot, Quant (<1.0 mg/dL) > 9.0  H
 
  C-React Prot High Sens (1.0 - 3.0 mg/L) > 15.0  H
 
  Total Protein (6.3 - 8.2 g/dL) 6.5
 
  Albumin (3.5 - 5.0 g/dL) 3.6
 
  Globulin (1.9 - 4.2 gm/dL) 2.9
 
  Albumin/Globulin Ratio (1.1 - 2.2 %) 1.2
 
  Amylase (30 - 110 U/L) 34
 
  Lipase (23 - 300 U/L) 34
 
Coagulation 
 
  PT (9.4 - 12.5 SEC) 13.2  H
 
  INR (0.90 - 1.17) 1.26  H
 
Hematology 
 
  CBC w Diff MAN DIFF ORDERED
 
  WBC (4.8 - 10.8 /CUMM) 19.7  H
 
  RBC (4.70 - 6.10 /CUMM) 4.75
 
  Hgb (14.0 - 18.0 G/DL) 15.4
 
  Hct (42 - 52 %) 46.1
 
  MCV (80.0 - 94.0 FL) 97.0  H
 
  MCH (27.0 - 31.0 PG) 32.5  H
 
  RDW (11.5 - 14.5 %) 13.6
 
  Plt Count (130 - 400 /CUMM) 571  H
 
  MPV (7.4 - 10.4 FL) 7.1  L
 
  Gran % (42.2 - 75.2 %) 86.2  H
 
  Lymphocytes % (20.5 - 51.1 %) 5.4  L
 
  Monocytes % (1.7 - 9.3 %) 7.6
 
  Eosinophils % (0 - 5 %) 0.5
 
  Basophils % (0.0 - 2.0 %) 0.3
 
  Absolute Granulocytes (1.4 - 6.5 /CUMM) 17.0  H
 
  Segmented Neutrophils (42.2 - 75.2 %) 63
 
  Band Neutrophils (0.0 - 5.0 %) 18  H
 
  Absolute Lymphocytes (1.2 - 3.4 /CUMM) 1.1  L
 
  Lymphocytes (20.5 - 51.1 %) 11  L
 
  Monocytes (1.7 - 9.3 %) 7
 
  Absolute Monocytes (0.10 - 0.60 /CUMM) 1.5  H
 
  Eosinophils (0 - 5.0 %) 1
 
  Absolute Eosinophils (0.0 - 0.7 /CUMM) 0.1
 
  Absolute Basophils (0.0 - 0.2 /CUMM) 0.1
 
  Platelet Estimate (ADEQUATE) INCREASED
 
  Normocytic RBCs VERIFIED
 
  Normochromic RBCs VERIFIED
 
  PUBS MCHC (33.0 - 37.0 G/DL) 33.5
 
Toxicology 
 
  Serum Alcohol (<10 MG/DL) < 10.0
 
 
 
 
Assessment/Plan
Assessment/Plan
This 57 year old male presented yesterday with acute sigmoid diverticulitis with
perisigmoid abscess s/p percutaneous drainage by IR yesterday
 
currently npo / ivf
pain control as ordered. may consider adding toradol
f/u labs and perc drain cxs
flush drainage catheter with 10 mL sterile saline twice daily as per IR 
recommendations
continue IV unasyn for abscess / diverticulitis
hep sc - dvt ppx
oob/ambulation
serial exams
will d/w 
 
 
Core Measures
 
Venous Thromboembolism
VTE Risk Factors Age>40
No Mechanical VTE Prophylaxis d/t N/A MechProphylax Ordered
No VTE Pharm Prophylaxis d/t NA PharmProphylax ordered

## 2018-01-18 VITALS — SYSTOLIC BLOOD PRESSURE: 138 MMHG | DIASTOLIC BLOOD PRESSURE: 100 MMHG

## 2018-01-18 VITALS — DIASTOLIC BLOOD PRESSURE: 98 MMHG | SYSTOLIC BLOOD PRESSURE: 138 MMHG

## 2018-01-18 VITALS — SYSTOLIC BLOOD PRESSURE: 132 MMHG | DIASTOLIC BLOOD PRESSURE: 78 MMHG

## 2018-01-18 VITALS — SYSTOLIC BLOOD PRESSURE: 132 MMHG | DIASTOLIC BLOOD PRESSURE: 80 MMHG

## 2018-01-18 VITALS — SYSTOLIC BLOOD PRESSURE: 128 MMHG | DIASTOLIC BLOOD PRESSURE: 98 MMHG

## 2018-01-18 VITALS — DIASTOLIC BLOOD PRESSURE: 86 MMHG | SYSTOLIC BLOOD PRESSURE: 142 MMHG

## 2018-01-18 LAB
ABSOLUTE GRANULOCYTE CT: 8.5 /CUMM (ref 1.4–6.5)
BASOPHILS # BLD: 0 /CUMM (ref 0–0.2)
BASOPHILS NFR BLD: 0.2 % (ref 0–2)
EOSINOPHIL # BLD: 0.4 /CUMM (ref 0–0.7)
EOSINOPHIL NFR BLD: 3 % (ref 0–5)
ERYTHROCYTE [DISTWIDTH] IN BLOOD BY AUTOMATED COUNT: 13.7 % (ref 11.5–14.5)
GRANULOCYTES NFR BLD: 72.2 % (ref 42.2–75.2)
HCT VFR BLD CALC: 42.1 % (ref 42–52)
LYMPHOCYTES # BLD: 1.6 /CUMM (ref 1.2–3.4)
MCH RBC QN AUTO: 32.6 PG (ref 27–31)
MCHC RBC AUTO-ENTMCNC: 33.5 G/DL (ref 33–37)
MCV RBC AUTO: 97.2 FL (ref 80–94)
MONOCYTES # BLD: 1.3 /CUMM (ref 0.1–0.6)
PLATELET # BLD: 621 /CUMM (ref 130–400)
PMV BLD AUTO: 7 FL (ref 7.4–10.4)
RED BLOOD CELL CT: 4.33 /CUMM (ref 4.7–6.1)
WBC # BLD AUTO: 11.8 /CUMM (ref 4.8–10.8)

## 2018-01-18 NOTE — PATIENT DISCHARGE INSTRUCTIONS
Discharge Instructions
 
General Discharge Information
You were seen/treated for:
Contained perforation of diverticulitis with abscess
You had these procedures:
Interventional radiology drainage and tube placement
Watch for these problems:
Worsening abdominal pain, nausea, vomiting, fever
Other wound care:
Band-Aid as needed
Special Instructions:
Take antibiotics as directed for infection, call to make an appointment for 
follow-up in one week
 
Diet
Continue normal diet: No
Recommended Diet: Low Residue
 
Activity
Full Activity/No Limits: No
Activity Self Limited: Yes
 
Acute Coronary Syndrome
 
Inclusion Criteria
At DC or during hospital stay patient has or had the following:
ACS DIAGNOSIS No
 
Discharge Core Measures
Meds if any: Prescribed or Continued at Discharge
Meds if any: NOT Prescribed or Continued at Discharge
 
Congestive Heart Failure
 
Inclusion Criteria
At DC or during hospital stay patient has or had the following:
CHF DIAGNOSIS No
 
Discharge Core Measures
Meds if any: Prescribed or Continued at Discharge
Meds if any: NOT Prescribed or Continued at Discharge
 
Cerebrovascular accident
 
Inclusion Criteria
At DC or during hospital stay patient has or had the following:
CVA/TIA Diagnosis No
 
Discharge Core Measures
Meds if any: Prescribed or Continued at Discharge
Meds if any: NOT Prescribed or Continued at Discharge
 
Venous thromboembolism
 
Inclusion Criteria
VTE Diagnosis No
VTE Type NONE
VTE Confirmed by (Test) NONE
 
Discharge Core Measures
- Per Current guidelines, there needs to be overlap
- treatment for the first 5 days of Warfarin therapy.
- If discharged on Warfarin prior to 5 days of
- overlap therapy, the patient will need to be
- assessed for post discharge needs including
- *Post discharge parental anticoagulation
- *Warfarin and/or parental anticoagulation education
- *Follow up date to check INR post discharge
At least 5 days overlap therapy as Inpatient No
Meds if any: Prescribed or Continued at Discharge
Note: Overlap Therapy is Warfarin and Anticoagulant
Meds if any: NOT Prescribed or Continued at Discharge

## 2018-01-18 NOTE — PN- GENERAL SURGERY
**See Addendum**
Subjective
Subjective:
Patient reports improvement in his pain since admission. He states he required 
Morphine yesterday bc Tylenol wasnt aqeduately treating his pain. He denies any 
n/v/f/c. He reports passing flatus, denies BMs. States he is only ambulating to 
the bathroom and is requesting to have something to eat.
 
Objective
Vital Signs and I&Os
Vital Signs
 
 
Date Time Temp Pulse Resp B/P B/P Pulse O2 O2 Flow FiO2
 
     Mean Ox Delivery Rate 
 
01/18 0223 98.4 71 20 132/78  92 Room Air  
 
01/18 0000 98.4 71 20 132/78     
 
01/17 2158 99.4 84 20 130/80  91 Room Air  
 
01/17 1800 99.5 68 18 130/80  94 Room Air  
 
01/17 1402 98.8 67 18 124/76  95 Room Air  
 
01/17 1026 97.9 80 20 128/78  97 Room Air  
 
 
 Intake & Output
 
 
 01/18 0800 01/18 0000 01/17 1600 01/17 0800 01/17 0000 01/16 1600
 
Intake Total 1100  900 5937 355 6643
 
Output Total 35 10 220 620 430 
 
Balance 1065 -10 680 440 -120 1000
 
       
 
Intake, IV 1100  900 1192 443 6995
 
Intake, Oral   0 60 60 
 
Number   0   
 
Bowel      
 
Movements      
 
Output, 35 10 20 20 430 
 
Drainage      
 
Output, Other      
 
Output, Urine   200 600  
 
Patient  180 lb   180 lb 180 lb
 
Weight      
 
Weight     Reported by Patient Reported by Patient
 
Measurement      
 
Method      
 
 
 
Physical Exam:
Gen - resting comfortably awak and alert in NAD
Lungs - CTAB
Cardiac - S1S2 noted
Abdomen - soft, mildly distended with perc right lower quadrant drain in  place 
with malodorous purulent drainage, moderately tender to palpation in lower 
quadrants, no rebound or guarding noted
Extremities - no edema or calf tenderness B/L
 
Current Medications:
 Current Medications
 
 
  Sig/Eddie Start time  Last
 
Medication Dose Route Stop Time Status Admin
 
Acetaminophen 1,000 MG Q6P PRN 01/16 1530 AC 01/18
 
N/A 1 UNIT IV   0420
 
Ampicillin Sodium/ 3,000 MG Q6H 01/16 2200 AC 01/18
 
Sulbactam Sodium  IV   0406
 
Sodium Chloride 100 ML    
 
Heparin Sodium  5,000 UNIT Q8 01/16 2200 AC 
 
(Porcine)  SC   
 
Ketorolac  30 MG Q6P PRN 01/17 0800 AC 
 
Tromethamine  IV   
 
Lorazepam 0 Q1P PRN 01/16 1745 AC 
 
  IV   
 
Melatonin 5 MG AT BEDTIME 01/17 0015  01/17
 
  PO   2149
 
Morphine Sulfate 4 MG Q4-6 PRN PRN 01/16 1530 AC 01/17
 
  IV   2149
 
Morphine Sulfate 6 MG Q4-6 PRN PRN 01/16 1530 AC 
 
  IV   
 
Morphine Sulfate 2 MG Q4P PRN 01/16 1530 AC 
 
  IV   
 
Ondansetron HCl 4 MG Q6P PRN 01/16 1530 AC 
 
  IV   
 
Pantoprazole Sodium 40 MG DAILY 01/17 1000 AC 01/17
 
  IV   0958
 
Patient Medication  1 ED ONE ONE 01/17 1200 DC 01/17
 
Teaching  ED 01/17 1201  1213
 
Potassium Chloride 10 MEQ Q1H 01/17 1015 DC 01/17
 
  IV 01/17 1116  1826
 
Potassium Chloride 20 MEQ Q8H 01/16 1715  01/18
 
Dextrose/Sodium  1,000 ML IV   0049
 
Chloride     
 
 
 
 
Results
Last 48 Hours of Labs:
 Laboratory Tests
 
 
 01/17 01/16 01/16
 
 0619 1456 1427
 
Chemistry   
 
  Sodium (137 - 145 mmol/L) 141  
 
  Potassium (3.5 - 5.1 mmol/L) 3.4  L  
 
  Chloride (98 - 107 mmol/L) 103  
 
  Carbon Dioxide (22 - 30 mmol/L) 25  
 
  Anion Gap (5 - 16) 13  
 
  BUN (9 - 20 mg/dL) 12  
 
  Creatinine (0.7 - 1.2 mg/dL) 0.7  
 
  Estimated GFR (>60 ml/min) > 60  
 
  BUN/Creatinine Ratio (7 - 25 %) 17.1  
 
  Lactic Acid   Cancelled
 
Coagulation   
 
  PT  Cancelled 
 
  INR  Cancelled 
 
Hematology   
 
  CBC w Diff NO MAN DIFF REQ  
 
  WBC (4.8 - 10.8 /CUMM) 12.6  H  
 
  RBC (4.70 - 6.10 /CUMM) 3.81  L  
 
  Hgb (14.0 - 18.0 G/DL) 12.6  L  
 
  Hct (42 - 52 %) 37.0  L  
 
  MCV (80.0 - 94.0 FL) 97.3  H  
 
  MCH (27.0 - 31.0 PG) 33.1  H  
 
  RDW (11.5 - 14.5 %) 14.0  
 
  Plt Count (130 - 400 /CUMM) 494  H  
 
  MPV (7.4 - 10.4 FL) 7.3  L  
 
  Gran % (42.2 - 75.2 %) 75.1  
 
  Lymphocytes % (20.5 - 51.1 %) 12.7  L  
 
  Monocytes % (1.7 - 9.3 %) 10.1  H  
 
  Eosinophils % (0 - 5 %) 2.0  
 
  Basophils % (0.0 - 2.0 %) 0.1  
 
  Absolute Granulocytes (1.4 - 6.5 /CUMM) 9.5  H  
 
  Absolute Lymphocytes (1.2 - 3.4 /CUMM) 1.6  
 
  Absolute Monocytes (0.10 - 0.60 /CUMM) 1.3  H  
 
  Absolute Eosinophils (0.0 - 0.7 /CUMM) 0.3  
 
  Absolute Basophils (0.0 - 0.2 /CUMM) 0  
 
  PUBS MCHC (33.0 - 37.0 G/DL) 34.0  
 
 
 
 
 01/16
 
 1139
 
Chemistry 
 
  Sodium (137 - 145 mmol/L) 139
 
  Potassium (3.5 - 5.1 mmol/L) 3.3  L
 
  Chloride (98 - 107 mmol/L) 94  L
 
  Carbon Dioxide (22 - 30 mmol/L) 27
 
  Anion Gap (5 - 16) 18  H
 
  BUN (9 - 20 mg/dL) 17
 
  Creatinine (0.7 - 1.2 mg/dL) 0.9
 
  Estimated GFR (>60 ml/min) > 60
 
  BUN/Creatinine Ratio (7 - 25 %) 18.9
 
  Glucose (65 - 99 mg/dL) 123  H
 
  Lactic Acid (0.7 - 2.1 mmol/L) 1.8
 
  Calcium (8.4 - 10.2 mg/dL) 9.0
 
  Total Bilirubin (0.2 - 1.3 mg/dL) 2.7  H
 
  AST (17 - 59 U/L) 30
 
  ALT (21 - 72 U/L) 53
 
  Alkaline Phosphatase (< 127 U/L) 166  H
 
  Troponin I (<0.11 ng/ml) < 0.01
 
  C-Reactive Prot, Quant (<1.0 mg/dL) > 9.0  H
 
  C-React Prot High Sens (1.0 - 3.0 mg/L) > 15.0  H
 
  Total Protein (6.3 - 8.2 g/dL) 6.5
 
  Albumin (3.5 - 5.0 g/dL) 3.6
 
  Globulin (1.9 - 4.2 gm/dL) 2.9
 
  Albumin/Globulin Ratio (1.1 - 2.2 %) 1.2
 
  Amylase (30 - 110 U/L) 34
 
  Lipase (23 - 300 U/L) 34
 
Coagulation 
 
  PT (9.4 - 12.5 SEC) 13.2  H
 
  INR (0.90 - 1.17) 1.26  H
 
Hematology 
 
  CBC w Diff MAN DIFF ORDERED
 
  WBC (4.8 - 10.8 /CUMM) 19.7  H
 
  RBC (4.70 - 6.10 /CUMM) 4.75
 
  Hgb (14.0 - 18.0 G/DL) 15.4
 
  Hct (42 - 52 %) 46.1
 
  MCV (80.0 - 94.0 FL) 97.0  H
 
  MCH (27.0 - 31.0 PG) 32.5  H
 
  RDW (11.5 - 14.5 %) 13.6
 
  Plt Count (130 - 400 /CUMM) 571  H
 
  MPV (7.4 - 10.4 FL) 7.1  L
 
  Gran % (42.2 - 75.2 %) 86.2  H
 
  Lymphocytes % (20.5 - 51.1 %) 5.4  L
 
  Monocytes % (1.7 - 9.3 %) 7.6
 
  Eosinophils % (0 - 5 %) 0.5
 
  Basophils % (0.0 - 2.0 %) 0.3
 
  Absolute Granulocytes (1.4 - 6.5 /CUMM) 17.0  H
 
  Segmented Neutrophils (42.2 - 75.2 %) 63
 
  Band Neutrophils (0.0 - 5.0 %) 18  H
 
  Absolute Lymphocytes (1.2 - 3.4 /CUMM) 1.1  L
 
  Lymphocytes (20.5 - 51.1 %) 11  L
 
  Monocytes (1.7 - 9.3 %) 7
 
  Absolute Monocytes (0.10 - 0.60 /CUMM) 1.5  H
 
  Eosinophils (0 - 5.0 %) 1
 
  Absolute Eosinophils (0.0 - 0.7 /CUMM) 0.1
 
  Absolute Basophils (0.0 - 0.2 /CUMM) 0.1
 
  Platelet Estimate (ADEQUATE) INCREASED
 
  Normocytic RBCs VERIFIED
 
  Normochromic RBCs VERIFIED
 
  PUBS MCHC (33.0 - 37.0 G/DL) 33.5
 
Toxicology 
 
  Serum Alcohol (<10 MG/DL) < 10.0
 
 
 
 
Assessment/Plan
Assessment/Plan
This is a 57 year old male admitted with acute sigmoid diverticulitis with 
perisigmoid abscess PPD 2 s/p percutaneous drainage with preliminary culture's 
growing GNR, remains tender on exam
 
Cont bowel rest, IVF
Cont pain regimen - IV tylenol/morphine/toradol
Cont IV unasyn until cx finalized
Flush drainage catheter with 10 cc sterile saline bid
GI ppx on board
Pt refusing hsq, was encouraged to ambulate frequently and discussed risk of 
developing clots 
Encourage ambulation
F/u labs
Will d/w 
 
 
 
Core Measures
 
Venous Thromboembolism
VTE Risk Factors Age>40
No Mechanical VTE Prophylaxis d/t N/A MechProphylax Ordered
No VTE Pharm Prophylaxis d/t NA PharmProphylax ordered

## 2018-01-19 VITALS — DIASTOLIC BLOOD PRESSURE: 80 MMHG | SYSTOLIC BLOOD PRESSURE: 124 MMHG

## 2018-01-19 VITALS — SYSTOLIC BLOOD PRESSURE: 142 MMHG | DIASTOLIC BLOOD PRESSURE: 84 MMHG

## 2018-01-19 VITALS — SYSTOLIC BLOOD PRESSURE: 142 MMHG | DIASTOLIC BLOOD PRESSURE: 88 MMHG

## 2018-01-19 VITALS — DIASTOLIC BLOOD PRESSURE: 84 MMHG | SYSTOLIC BLOOD PRESSURE: 128 MMHG

## 2018-01-19 VITALS — DIASTOLIC BLOOD PRESSURE: 92 MMHG | SYSTOLIC BLOOD PRESSURE: 132 MMHG

## 2018-01-19 VITALS — DIASTOLIC BLOOD PRESSURE: 82 MMHG | SYSTOLIC BLOOD PRESSURE: 130 MMHG

## 2018-01-19 LAB
ABSOLUTE GRANULOCYTE CT: 8.6 /CUMM (ref 1.4–6.5)
BASOPHILS # BLD: 0 /CUMM (ref 0–0.2)
BASOPHILS NFR BLD: 0.4 % (ref 0–2)
EOSINOPHIL # BLD: 0.5 /CUMM (ref 0–0.7)
EOSINOPHIL NFR BLD: 4.1 % (ref 0–5)
ERYTHROCYTE [DISTWIDTH] IN BLOOD BY AUTOMATED COUNT: 14.1 % (ref 11.5–14.5)
GRANULOCYTES NFR BLD: 71.2 % (ref 42.2–75.2)
HCT VFR BLD CALC: 39.3 % (ref 42–52)
LYMPHOCYTES # BLD: 1.8 /CUMM (ref 1.2–3.4)
MCH RBC QN AUTO: 32.7 PG (ref 27–31)
MCHC RBC AUTO-ENTMCNC: 33.9 G/DL (ref 33–37)
MCV RBC AUTO: 96.4 FL (ref 80–94)
MONOCYTES # BLD: 1.1 /CUMM (ref 0.1–0.6)
PLATELET # BLD: 667 /CUMM (ref 130–400)
PMV BLD AUTO: 6.8 FL (ref 7.4–10.4)
RED BLOOD CELL CT: 4.08 /CUMM (ref 4.7–6.1)
WBC # BLD AUTO: 12 /CUMM (ref 4.8–10.8)

## 2018-01-19 NOTE — PN- GENERAL SURGERY
**See Addendum**
Subjective
Subjective:
No overnight events.  Reported that he had some discomfort while switching 
rooms.  Last dose of morphine received at 6:30 AM.  Pain is adequately 
controlled with IV morphine.  No nausea vomiting.  No flatus/bowel movement.  
Tolerating minimal amount of clears liquid diet.
 
Objective
Vital Signs and I&Os
Vital Signs
 
 
Date Time Temp Pulse Resp B/P B/P Pulse O2 O2 Flow FiO2
 
     Mean Ox Delivery Rate 
 
01/19 0619 98.5 64 20 142/84  94   
 
01/19 0255 98.6 96 20 130/82  94 Room Air  
 
01/18 2226 98.6 64 18 138/100  95 Room Air  
 
01/18 1800 99.2 74 19 138/98  95 Room Air  
 
01/18 1406 98.9 65 18 128/98  96   
 
01/18 1000 99.7 65 18 142/86  95 Room Air  
 
 
 Intake & Output
 
 
 01/19 1600 01/19 0800 01/19 0000 01/18 1600 01/18 0800 01/18 0000
 
Intake Total  0591 961 3538 1100 
 
Output Total  30  20 35 10
 
Balance  5137 535 9852 1065 -10
 
       
 
Intake, IV  1601 038 7953 1100 
 
Intake, Oral  240 480 200  
 
Number   0 0  
 
Bowel      
 
Movements      
 
Output,  30  20 35 10
 
Drainage      
 
Patient      180 lb
 
Weight      
 
 
 
Physical Exam:
Appears in no distress, alert oriented 3 resting in bed comfortably.  Sling 
lung sounds are clear to auscultation bilaterally.  No additional sounds 
appreciated.  Heart rate is regular rate and rhythm.  With this and S2 
appreciated.
Abdomen soft, minimally distended, tender in the mid abdomen as well as in the 
left lower quadrant.  The IR drain site is clean dry and intact.  IR drain is 
draining feculent output.  No rebound guarding.
Current Medications:
 Current Medications
 
 
  Sig/Eddie Start time  Last
 
Medication Dose Route Stop Time Status Admin
 
Acetaminophen 1,000 MG .STK-MED ONE 01/18 1755 DC 
 
  IV 01/18 1756  
 
Acetaminophen 1,000 MG Q6P PRN 01/16 1530 AC 01/18
 
N/A 1 UNIT IV   1757
 
Ampicillin Sodium/ 3,000 MG Q6H 01/16 2200 AC 01/19
 
Sulbactam Sodium  IV   0405
 
Sodium Chloride 100 ML    
 
Heparin Sodium  5,000 UNIT Q8 01/16 2200 AC 
 
(Porcine)  SC   
 
Ketorolac  30 MG Q6P PRN 01/17 0800 AC 
 
Tromethamine  IV   
 
Lorazepam 0 Q1P PRN 01/16 1745 AC 
 
  IV   
 
Melatonin 5 MG AT BEDTIME 01/17 0015 AC 01/18
 
  PO   2229
 
Morphine Sulfate 4 MG Q4-6 PRN PRN 01/16 1530 AC 01/19
 
  IV   0638
 
Morphine Sulfate 6 MG Q4-6 PRN PRN 01/16 1530 AC 
 
  IV   
 
Morphine Sulfate 2 MG Q4P PRN 01/16 1530 AC 
 
  IV   
 
Ondansetron HCl 4 MG Q6P PRN 01/16 1530 AC 
 
  IV   
 
Pantoprazole Sodium 40 MG DAILY 01/17 1000 AC 01/18
 
  IV   0900
 
Potassium Chloride 20 MEQ Q8H 01/16 1715  01/19
 
Dextrose/Sodium  1,000 ML IV   0133
 
Chloride     
 
 
 
 
Results
Last 48 Hours of Labs:
 Laboratory Tests
 
 
 01/19 01/18
 
 0714 0745
 
Chemistry  
 
  Sodium (137 - 145 mmol/L)  142
 
  Potassium (3.5 - 5.1 mmol/L)  3.7
 
  Chloride (98 - 107 mmol/L)  104
 
  Carbon Dioxide (22 - 30 mmol/L)  26
 
  Anion Gap (5 - 16)  12
 
  BUN (9 - 20 mg/dL)  10
 
  Creatinine (0.7 - 1.2 mg/dL)  0.7
 
  Estimated GFR (>60 ml/min)  > 60
 
  BUN/Creatinine Ratio (7 - 25 %)  14.3
 
Hematology  
 
  CBC w Diff Pending NO MAN DIFF REQ
 
  WBC (4.8 - 10.8 /CUMM) Pending 11.8  H
 
  RBC (4.70 - 6.10 /CUMM) Pending 4.33  L
 
  Hgb (14.0 - 18.0 G/DL) Pending 14.1
 
  Hct (42 - 52 %) Pending 42.1
 
  MCV (80.0 - 94.0 FL) Pending 97.2  H
 
  MCH (27.0 - 31.0 PG) Pending 32.6  H
 
  RDW (11.5 - 14.5 %) Pending 13.7
 
  Plt Count (130 - 400 /CUMM) Pending 621  H
 
  MPV (7.4 - 10.4 FL) Pending 7.0  L
 
  Gran % (42.2 - 75.2 %)  72.2
 
  Lymphocytes % (20.5 - 51.1 %)  13.8  L
 
  Monocytes % (1.7 - 9.3 %)  10.8  H
 
  Eosinophils % (0 - 5 %)  3.0
 
  Basophils % (0.0 - 2.0 %)  0.2
 
  Absolute Granulocytes (1.4 - 6.5 /CUMM)  8.5  H
 
  Absolute Lymphocytes (1.2 - 3.4 /CUMM)  1.6
 
  Absolute Monocytes (0.10 - 0.60 /CUMM)  1.3  H
 
  Absolute Eosinophils (0.0 - 0.7 /CUMM)  0.4
 
  Absolute Basophils (0.0 - 0.2 /CUMM)  0
 
  PUBS MCHC (33.0 - 37.0 G/DL) Pending 33.5
 
 
 
 
Assessment/Plan
Assessment/Plan
55-year-old male admitted with perforated sigmoid diverticulitis with abscess 
status post IR drain placement,, being currently managed conservatively with IV 
antibiotics and IR drain.
 
Plan is #1 continue clear liquid diet and antibiotics(unasyn') and serial 
abdominal exam.
2. We'll transition from D5 normal saline to maintenance fluids.
3.  Strict I's and O's. 
4. We'll monitor CBCs/lytes and repelete lytes.
5. If continues to improve clinically will anticipate to advance the diet to low
fiber dietlatter in the day.
6.  Continue to flush the IR drain.
 
 
Problem List:
 1. Sigmoid diverticulitis
 
 2. Abscess of sigmoid colon due to diverticulitis
 
 
Core Measures
 
Venous Thromboembolism
VTE Risk Factors Age>40
No Mechanical VTE Prophylaxis d/t N/A MechProphylax Ordered
No VTE Pharm Prophylaxis d/t NA PharmProphylax ordered

## 2018-01-20 VITALS — DIASTOLIC BLOOD PRESSURE: 80 MMHG | SYSTOLIC BLOOD PRESSURE: 130 MMHG

## 2018-01-20 VITALS — SYSTOLIC BLOOD PRESSURE: 134 MMHG | DIASTOLIC BLOOD PRESSURE: 82 MMHG

## 2018-01-20 VITALS — SYSTOLIC BLOOD PRESSURE: 130 MMHG | DIASTOLIC BLOOD PRESSURE: 90 MMHG

## 2018-01-20 VITALS — DIASTOLIC BLOOD PRESSURE: 80 MMHG | SYSTOLIC BLOOD PRESSURE: 124 MMHG

## 2018-01-20 LAB
ABSOLUTE GRANULOCYTE CT: 6.5 /CUMM (ref 1.4–6.5)
BASOPHILS # BLD: 0 /CUMM (ref 0–0.2)
BASOPHILS NFR BLD: 0.3 % (ref 0–2)
EOSINOPHIL # BLD: 0.6 /CUMM (ref 0–0.7)
EOSINOPHIL NFR BLD: 5.7 % (ref 0–5)
ERYTHROCYTE [DISTWIDTH] IN BLOOD BY AUTOMATED COUNT: 14.3 % (ref 11.5–14.5)
GRANULOCYTES NFR BLD: 64.7 % (ref 42.2–75.2)
HCT VFR BLD CALC: 38.5 % (ref 42–52)
LYMPHOCYTES # BLD: 2 /CUMM (ref 1.2–3.4)
MCH RBC QN AUTO: 32.9 PG (ref 27–31)
MCHC RBC AUTO-ENTMCNC: 34.1 G/DL (ref 33–37)
MCV RBC AUTO: 96.4 FL (ref 80–94)
MONOCYTES # BLD: 0.9 /CUMM (ref 0.1–0.6)
PLATELET # BLD: 676 /CUMM (ref 130–400)
PMV BLD AUTO: 6.7 FL (ref 7.4–10.4)
RED BLOOD CELL CT: 4 /CUMM (ref 4.7–6.1)
WBC # BLD AUTO: 10 /CUMM (ref 4.8–10.8)

## 2018-01-20 NOTE — PN- GENERAL SURGERY
**See Addendum**
Subjective
Subjective:
"this is the best I've felt in 2 weeks", bhupinder clears- no n/v.  still bloated but 
much improved.  +flatus.  no bm. +voids. +oob  no cp/sob
 
Objective
Vital Signs and I&Os
Vital Signs
 
 
Date Time Temp Pulse Resp B/P B/P Pulse O2 O2 Flow FiO2
 
     Mean Ox Delivery Rate 
 
01/20 0613 98.3 60 20 134/82  94   
 
01/20 0212 98.4 60 20 130/80  92   
 
01/19 2251 99.1 66 20 124/80  96 Room Air  
 
01/19 1953 97.9 73 18 132/92  95 Room Air  
 
01/19 1430 98.5 62 20 128/84  94 Room Air  
 
01/19 1000 98.9 63 18 142/88  94 Room Air  
 
 
 Intake & Output
 
 
 01/20 1600 01/20 0800 01/20 0000 01/19 1600 01/19 0800 01/19 0000
 
Intake Total   1340 780
 
Output Total  312 30  30 
 
Balance   1310 780
 
       
 
Intake, IV  650  300
 
Intake, Oral   480 600 240 480
 
Number      0
 
Bowel      
 
Movements      
 
Output,  12 30  30 
 
Drainage      
 
Output, Urine  300    
 
 
 
Physical Exam:
gen- nad
card-s1s2 rrr
pulm- ctab
abd- softly dist, +bs, drain in place- dressing cdi, bag just emptied, nt
ext- calves soft nt bl
 
Results
Last 48 Hours of Labs:
 Laboratory Tests
 
 
 01/20 01/20
 
 0943 0705
 
Chemistry  
 
  Sodium Pending 
 
  Potassium Pending 
 
  Chloride Pending 
 
  Carbon Dioxide Pending 
 
  Anion Gap Pending 
 
  BUN Pending 
 
  Creatinine Pending 
 
  BUN/Creatinine Ratio Pending 
 
Hematology  
 
  CBC w Diff  NO MAN DIFF REQ
 
  WBC (4.8 - 10.8 /CUMM)  10.0
 
  RBC (4.70 - 6.10 /CUMM)  4.00  L
 
  Hgb (14.0 - 18.0 G/DL)  13.1  L
 
  Hct (42 - 52 %)  38.5  L
 
  MCV (80.0 - 94.0 FL)  96.4  H
 
  MCH (27.0 - 31.0 PG)  32.9  H
 
  RDW (11.5 - 14.5 %)  14.3
 
  Plt Count (130 - 400 /CUMM)  676  H
 
  MPV (7.4 - 10.4 FL)  6.7  L
 
  Gran % (42.2 - 75.2 %)  64.7
 
  Lymphocytes % (20.5 - 51.1 %)  19.9  L
 
  Monocytes % (1.7 - 9.3 %)  9.4  H
 
  Eosinophils % (0 - 5 %)  5.7  H
 
  Basophils % (0.0 - 2.0 %)  0.3
 
  Absolute Granulocytes (1.4 - 6.5 /CUMM)  6.5
 
  Absolute Lymphocytes (1.2 - 3.4 /CUMM)  2.0
 
  Absolute Monocytes (0.10 - 0.60 /CUMM)  0.9  H
 
  Absolute Eosinophils (0.0 - 0.7 /CUMM)  0.6
 
  Absolute Basophils (0.0 - 0.2 /CUMM)  0
 
  PUBS MCHC (33.0 - 37.0 G/DL)  34.1
 
 
 
 
 01/19
 
 0714
 
Hematology 
 
  CBC w Diff NO MAN DIFF REQ
 
  WBC (4.8 - 10.8 /CUMM) 12.0  H
 
  RBC (4.70 - 6.10 /CUMM) 4.08  L
 
  Hgb (14.0 - 18.0 G/DL) 13.4  L
 
  Hct (42 - 52 %) 39.3  L
 
  MCV (80.0 - 94.0 FL) 96.4  H
 
  MCH (27.0 - 31.0 PG) 32.7  H
 
  RDW (11.5 - 14.5 %) 14.1
 
  Plt Count (130 - 400 /CUMM) 667  H
 
  MPV (7.4 - 10.4 FL) 6.8  L
 
  Gran % (42.2 - 75.2 %) 71.2
 
  Lymphocytes % (20.5 - 51.1 %) 14.8  L
 
  Monocytes % (1.7 - 9.3 %) 9.5  H
 
  Eosinophils % (0 - 5 %) 4.1
 
  Basophils % (0.0 - 2.0 %) 0.4
 
  Absolute Granulocytes (1.4 - 6.5 /CUMM) 8.6  H
 
  Absolute Lymphocytes (1.2 - 3.4 /CUMM) 1.8
 
  Absolute Monocytes (0.10 - 0.60 /CUMM) 1.1  H
 
  Absolute Eosinophils (0.0 - 0.7 /CUMM) 0.5
 
  Absolute Basophils (0.0 - 0.2 /CUMM) 0
 
  PUBS MCHC (33.0 - 37.0 G/DL) 33.9
 
 
 
 
Assessment/Plan
Assessment/Plan
A- 57M HD4 with sigmoid diverticulitis with abscess, sp IR drain 1/16, 
clinically improved with normalized white count.
 
P-
LR diet
HL
OOB, ambulate, hep sq
cont abx
cont drain, daily flushes
?VNA
dw attending
 
 
Core Measures
 
Venous Thromboembolism
VTE Risk Factors Age>40
No Mechanical VTE Prophylaxis d/t N/A MechProphylax Ordered
No VTE Pharm Prophylaxis d/t NA PharmProphylax ordered

## 2018-01-21 VITALS — DIASTOLIC BLOOD PRESSURE: 78 MMHG | SYSTOLIC BLOOD PRESSURE: 136 MMHG

## 2018-01-21 NOTE — PN- GENERAL SURGERY
**See Addendum**
Subjective
Subjective:
HD#5 S/P IR DRAIANGE OF DIVERTICULAR ABSCESS
 
SITTING UP IN BED THIS AM IN [facility]
TOLERATING REGULAR DIET
DENIES CP, SOB, NO ABDOMINAL PAIN THIS AM
WBC YESTERDAY 10
+BM YESTERDAY
 
Objective
Vital Signs and I&Os
Vital Signs
 
 
Date Time Temp Pulse Resp B/P B/P Pulse O2 O2 Flow FiO2
 
     Mean Ox Delivery Rate 
 
01/21 0615 98.5 61 20 136/78  93   
 
01/20 2327 98.3 60 20 124/80  95 Room Air  
 
01/20 1423 98.2 61 20 130/90  94 Room Air  
 
 
 Intake & Output
 
 
 01/21 1600 01/21 0800 01/21 0000 01/20 1600 01/20 0800 01/20 0000
 
Intake Total   650 580
 
Output Total   15 1 312 30
 
Balance   338 550
 
       
 
Intake, IV  120 120 375 650 100
 
Intake, Oral  120 120 720  480
 
Number   0   
 
Bowel      
 
Movements      
 
Output,   15  12 30
 
Drainage      
 
Output, Stool    1  
 
Output, Urine     300 
 
 
 
Physical Exam:
CV: RRR
LUNGS: CLEAR
ABD: SOFT +BS
       SMALL AMOUNT OF DRAINAGE IN BAG
EXT: WARM, NO CALF TENDERNESS BILAT
 
Assessment/Plan
Assessment/Plan
STABLE
HAS BEEN SELF FLUSHING DRAINAGE TUBE WITHOUT DIFFICULTY
TOLERATING REGULAR DIET
+BM
 
PLAN
OOB AMBULATE
WILL D/W ATTENDING POSSIBLE D/C LATER TODAY AND ABX COURSE
WOULD LIKE VNS FOR HOME WITH DRAIN ASSISTANCE
 
 
Core Measures
 
Venous Thromboembolism
VTE Risk Factors Age>40
No Mechanical VTE Prophylaxis d/t N/A MechProphylax Ordered
No VTE Pharm Prophylaxis d/t NA PharmProphylax ordered

## 2018-01-21 NOTE — SURGICAL DISCHARGE SUMMARY
Visit Information
 
Visit Dates
Admission Date:
18
 
Discharge Date:
18
 
 
History of Present Illness
Chief Complaint:
abdominal pain. 
 
Medical History
EENT: NONE
Respiratory: NONE
Gastrointestinal: diverticulitis
Hepatic: NONE
Renal: NONE
Musculoskeletal: NONE
Psychiatric: NONE
Endocrine: NONE
Blood Disorders: NONE
Cancer(s): NONE
GYN/Reproductive: NONE
History of MRSA: No
History of VRE: No
History of CDIFF: No
Isolation History: Standard
 
Surgical History
Pertinent Surgical History: hernia repair-inguinal (Lap left in 2014)
 
Family History
Relations & Conditions If Any:
FATHER, ; Cause: Multiple myeloma.
 
 
Psychosocial History
What is Your Primary Language? English
ETOH Use: 4-6 beers/day
Review of Systems:
see preop hp
 
Hospital Course
 
Course
Attending Physician:
Dontrell Sanchez MD
 
Primary Care Physician:
Tc Sanders MD
 
Hospital Course:
admitted for diverticluar abscess for bowel rest and IV antibiotics. 
Interventional radiology percutaneously drained abscess. His pain and 
leukocytosis resolved. He was started on a diet and dicharged home. 
Allergies:
Coded Allergies:
NO KNOWN ALLERGIES (14)
 
Significant Procedures:
Percutaneous drainage sigmoid colon abscess. 
 
Disposition Summary
 
Disposition
Principal Diagnosis:
Diverticulitis with abscess
Additional Diagnosis:
none
Discharge Disposition: home health services
 
Discharge Instructions
 
General Discharge Information
Code Status: Full Code
Patient's Diet:
regular low fiber
Patient's Activity:
ad [place]
Follow-Up Instructions/Appts:
one week to arrange CT. 
 
Medications at Discharge
Discharge Medications:
Start taking the following new medications:
Oxycodone HCl/Acetaminophen (Percocet 5-325 MG Tablet) 5 MG-325 MG TABLET
    1-2 Tablet ORAL EVERY 4 HOURS AS NEEDED as needed for PAIN SCALE 4-6 (
MODERATE)
    Qty = 36
    No Refills
    Comments:
       Last Taken: 18
             Time: 1127
 
Amoxicillin/Potassium Clav (Augmentin 875-125 Tablet) 875 MG-125 MG TABLET
    1 Tablet ORAL TWICE DAILY
    Qty = 28
    No Refills